# Patient Record
Sex: FEMALE | Race: WHITE | NOT HISPANIC OR LATINO | Employment: FULL TIME | URBAN - METROPOLITAN AREA
[De-identification: names, ages, dates, MRNs, and addresses within clinical notes are randomized per-mention and may not be internally consistent; named-entity substitution may affect disease eponyms.]

---

## 2017-11-27 ENCOUNTER — HISTORICAL (OUTPATIENT)
Dept: ENDOSCOPY | Facility: HOSPITAL | Age: 57
End: 2017-11-27

## 2021-03-03 ENCOUNTER — HISTORICAL (OUTPATIENT)
Dept: ADMINISTRATIVE | Facility: HOSPITAL | Age: 61
End: 2021-03-03

## 2021-03-03 LAB
ALBUMIN SERPL-MCNC: 4.2 GM/DL (ref 3.4–5)
ALBUMIN/GLOB SERPL: 1.91 {RATIO} (ref 1.5–2.5)
ALP SERPL-CCNC: 37 UNIT/L (ref 38–126)
ALT SERPL-CCNC: 17 UNIT/L (ref 7–52)
AST SERPL-CCNC: 20 UNIT/L (ref 15–37)
BILIRUB SERPL-MCNC: 0.4 MG/DL (ref 0.2–1)
BILIRUBIN DIRECT+TOT PNL SERPL-MCNC: 0.1 MG/DL (ref 0–0.5)
BILIRUBIN DIRECT+TOT PNL SERPL-MCNC: 0.3 MG/DL
BUN SERPL-MCNC: 18 MG/DL (ref 7–18)
CALCIUM SERPL-MCNC: 9 MG/DL (ref 8.5–10)
CHLORIDE SERPL-SCNC: 103 MMOL/L (ref 98–107)
CHOLEST SERPL-MCNC: 156 MG/DL (ref 0–200)
CHOLEST/HDLC SERPL: 3.4 {RATIO}
CO2 SERPL-SCNC: 28 MMOL/L (ref 21–32)
CREAT SERPL-MCNC: 0.89 MG/DL (ref 0.6–1.3)
GLOBULIN SER-MCNC: 2.2 GM/DL (ref 1.2–3)
GLUCOSE SERPL-MCNC: 106 MG/DL (ref 74–106)
HDLC SERPL-MCNC: 46 MG/DL (ref 35–60)
LDLC SERPL CALC-MCNC: 88 MG/DL (ref 0–129)
POTASSIUM SERPL-SCNC: 4.1 MMOL/L (ref 3.5–5.1)
PROT SERPL-MCNC: 6.4 GM/DL (ref 6.4–8.2)
SODIUM SERPL-SCNC: 138 MMOL/L (ref 136–145)
TRIGL SERPL-MCNC: 174 MG/DL (ref 30–150)
VLDLC SERPL CALC-MCNC: 34.8 MG/DL

## 2021-09-13 ENCOUNTER — HISTORICAL (OUTPATIENT)
Dept: ADMINISTRATIVE | Facility: HOSPITAL | Age: 61
End: 2021-09-13

## 2021-09-13 LAB
ALBUMIN SERPL-MCNC: 4.1 GM/DL (ref 3.4–5)
ALBUMIN/GLOB SERPL: 1.78 {RATIO} (ref 1.5–2.5)
ALP SERPL-CCNC: 44 UNIT/L (ref 38–126)
ALT SERPL-CCNC: 12 UNIT/L (ref 7–52)
AST SERPL-CCNC: 22 UNIT/L (ref 15–37)
BILIRUB SERPL-MCNC: 0.5 MG/DL (ref 0.2–1)
BILIRUBIN DIRECT+TOT PNL SERPL-MCNC: 0.1 MG/DL (ref 0–0.5)
BILIRUBIN DIRECT+TOT PNL SERPL-MCNC: 0.4 MG/DL
BUN SERPL-MCNC: 19 MG/DL (ref 7–18)
CALCIUM SERPL-MCNC: 9 MG/DL (ref 8.5–10)
CHLORIDE SERPL-SCNC: 103 MMOL/L (ref 98–107)
CHOLEST SERPL-MCNC: 159 MG/DL (ref 0–200)
CHOLEST/HDLC SERPL: 3 {RATIO}
CO2 SERPL-SCNC: 23 MMOL/L (ref 21–32)
CREAT SERPL-MCNC: 0.74 MG/DL (ref 0.6–1.3)
GLOBULIN SER-MCNC: 2.3 GM/DL (ref 1.2–3)
GLUCOSE SERPL-MCNC: 98 MG/DL (ref 74–106)
HDLC SERPL-MCNC: 53 MG/DL (ref 35–60)
LDLC SERPL CALC-MCNC: 89 MG/DL (ref 0–129)
POTASSIUM SERPL-SCNC: 4.2 MMOL/L (ref 3.5–5.1)
PROT SERPL-MCNC: 6.4 GM/DL (ref 6.4–8.2)
SODIUM SERPL-SCNC: 140 MMOL/L (ref 136–145)
TRIGL SERPL-MCNC: 105 MG/DL (ref 30–150)
VLDLC SERPL CALC-MCNC: 21 MG/DL

## 2022-04-10 ENCOUNTER — HISTORICAL (OUTPATIENT)
Dept: ADMINISTRATIVE | Facility: HOSPITAL | Age: 62
End: 2022-04-10

## 2022-04-26 VITALS
DIASTOLIC BLOOD PRESSURE: 69 MMHG | BODY MASS INDEX: 19.88 KG/M2 | SYSTOLIC BLOOD PRESSURE: 123 MMHG | HEIGHT: 62 IN | WEIGHT: 108 LBS

## 2022-04-30 NOTE — OP NOTE
DATE OF SURGERY:    11/27/2017    SURGEON:  NOLVIA Ponce MD    PROCEDURE:  48 hour pH monitoring.    PREOPERATIVE DIAGNOSIS:  Constant throat clearing.  Rule out correlation between acid reflux and throat clearing.    POSTOPERATIVE DIAGNOSIS:    1. No significant acid reflux.  2. No significant correlation between cough and acid reflux event.    PROCEDURE IN DETAIL:  The procedure was performed in technically satisfactory fashion.       Combined findings revealed 48 hours of study observation, 27 hours in upright position, 21 hours in supine position.  Ruddy-DeMeester average was 4.6, normals being less than 14.72.  Symptom analysis revealed one complaint of heartburn and 22 complaints of cough.  Symptom associated probability was 82%, making this not statistically significant.     The patient had been off PPIs for this test for a month prior to the procedure.        ______________________________  NOLVIA Ponce MD    JPH/RICA  DD:  12/14/2017  Time:  07:21AM  DT:  12/14/2017  Time:  01:21PM  Job #:  [id number]    cc: MD JAVED Gallegos Jr., MD

## 2022-04-30 NOTE — H&P
Patient:   Katherine Pino            MRN: [id number]            FIN: [id number]               Age:   57 years     Sex:  Female     :  1960   Associated Diagnoses:   None   Author:   ANNMARIE Ponce MD      Chief Complaint   Constant throat clearing and possible reflux correlation      History of Present Illness   Pleasant 57-year-old female with a greater than year history of intermittent episodes of constant throat clearing.  It has not been related to asthma or ENT sources.  She was placed on PPIs and H2 is thinking that this might be the etiology of her throat clearing.  She denies any odynophagia or any regurgitation.  Her weight is stable she has no hematemesis or melena.  Interestingly when I took her off the PPI and H2 blocker for the bravo test her throat clearing improved a bit but she did begin to have some heartburn that she had not had in the past she is brought in today for a 48 hour bravo pH study.  She has been off of PPIs and H2 use for the past 10 days.      Health Status   Current medications:  (Selected)   Documented Medications  Documented  Boniva 150 mg oral tablet: 150 mg = 1 tab(s), Oral, qMonth, # 1 tab(s), 0 Refill(s)  Zantac 150 oral tablet: 150 mg = 1 tab(s), Oral, BID, # 180 tab(s), 0 Refill(s)  metFORMIN: Oral, 0 Refill(s)  omeprazole 40 mg oral DR capsule: 80 mg = 2 cap(s), Oral, Daily, # 180 cap(s), 0 Refill(s),    No qualifying data available        Physical Examination      Vital Signs (last 24 hrs)_____  Last Charted___________  Temp Tympanic    36.4 DegC  (:)  Heart Rate Peripheral   72 bpm  (:)  Resp Rate         14 br/min  (:)  SBP      120 mmHg  (:)  DBP      73 mmHg  (:)  SpO2      100 %  (:)     General:  Alert and oriented.    Eye:  Pupils are equal, round and reactive to light.    HENT:  Normal hearing.    Neck:  Supple.    Respiratory:  Lungs are clear to auscultation.     Cardiovascular:  Normal rate, Regular rhythm.       Impression and Plan   Rule out reflux related throat clearing    EGD    ANNMARIE Ponce M.D.

## 2023-03-13 PROBLEM — M81.0 AGE RELATED OSTEOPOROSIS: Status: ACTIVE | Noted: 2023-03-13

## 2023-03-13 PROBLEM — E78.1 HYPERTRIGLYCERIDEMIA: Status: ACTIVE | Noted: 2023-03-13

## 2023-03-13 PROBLEM — I10 PRIMARY HYPERTENSION: Status: ACTIVE | Noted: 2023-03-13

## 2024-03-14 PROBLEM — R73.03 PREDIABETES: Status: ACTIVE | Noted: 2024-03-14

## 2024-03-14 PROBLEM — E04.2 MULTINODULAR THYROID: Status: ACTIVE | Noted: 2024-03-14

## 2024-11-19 ENCOUNTER — HOSPITAL ENCOUNTER (EMERGENCY)
Facility: HOSPITAL | Age: 64
Discharge: HOME | End: 2024-11-19
Attending: EMERGENCY MEDICINE
Payer: MEDICARE

## 2024-11-19 VITALS
DIASTOLIC BLOOD PRESSURE: 76 MMHG | BODY MASS INDEX: 20.8 KG/M2 | TEMPERATURE: 98 F | HEIGHT: 62 IN | SYSTOLIC BLOOD PRESSURE: 125 MMHG | HEART RATE: 72 BPM | OXYGEN SATURATION: 96 % | WEIGHT: 113 LBS | RESPIRATION RATE: 18 BRPM

## 2024-11-19 DIAGNOSIS — S76.911A MUSCLE STRAIN OF RIGHT THIGH, INITIAL ENCOUNTER: Primary | ICD-10-CM

## 2024-11-19 DIAGNOSIS — M79.604 RIGHT LEG PAIN: ICD-10-CM

## 2024-11-19 PROCEDURE — 99284 EMERGENCY DEPT VISIT MOD MDM: CPT | Mod: 25

## 2024-11-19 RX ORDER — TIZANIDINE 2 MG/1
2 TABLET ORAL EVERY 8 HOURS PRN
Qty: 30 TABLET | Refills: 0 | Status: SHIPPED | OUTPATIENT
Start: 2024-11-19 | End: 2024-11-29

## 2024-11-19 RX ORDER — IBUPROFEN 800 MG/1
800 TABLET ORAL EVERY 6 HOURS PRN
Qty: 20 TABLET | Refills: 0 | Status: SHIPPED | OUTPATIENT
Start: 2024-11-19

## 2024-11-19 NOTE — FIRST PROVIDER EVALUATION
Medical screening examination initiated.  I have conducted a focused provider triage encounter, findings are as follows:    Brief history of present illness:  64-year-old female presents to ED for evaluation of right leg/thigh pain over the last week.  Denies any trauma or injury.  Patient concerned about a blood clot    There were no vitals filed for this visit.    Pertinent physical exam:  Patient is awake and alert and oriented.  Ambulatory to triage.  In no acute distress.      Brief workup plan:  US    Preliminary workup initiated; this workup will be continued and followed by the physician or advanced practice provider that is assigned to the patient when roomed.

## 2024-11-20 NOTE — DISCHARGE INSTRUCTIONS
For pain, take ibuprofen every 6 hours as needed.  Okay to alternate with over-the-counter Tylenol.      Additionally, you may take tizanidine every 8 hours as needed.      Recommend rest, ice, heat.    Recommend follow up with primary care.  Return to ER with worsening symptoms.

## 2024-11-20 NOTE — ED PROVIDER NOTES
Encounter Date: 2024       History     Chief Complaint   Patient presents with    Leg Pain     Pt to ED via POV. Pt endorses R thigh pain X a few days. Denies swelling, redness, and traumatic injuries.     See MDM for details     The history is provided by the patient.     Review of patient's allergies indicates:  No Known Allergies  No past medical history on file.  Past Surgical History:   Procedure Laterality Date    Abdomen endoscopy (2017)      Biopsy Gastrointestinal (2017)      Levy PH (2017      Colonoscopy (2019)      COSMETIC SURGERY  ?    Breast augmentation    D/C in past      Esophagogastroduodenoscopy (2017)      Esophagogastroduodenoscopy, flexible, transoral; with biopsy, single or multiple (2017)      Excision of Middle Esophagus, Via Natural or Artificial Opening      Excision of Upper Esophagus, Via Natural or Artificial Opening      H/O breast augmentation      History of tonsillectomy      TONSILLECTOMY  ?     Family History   Problem Relation Name Age of Onset    Hypertension Mother Rose Collette         HBP    Heart attack Mother Rose Collette     Arthritis Mother Rose Collette         Arthritis    COPD Mother Rose Collette         Had COPD    Heart disease Mother Rose Collette          of a heart attack    Skin cancer Father Etley Collette     Leukemia Father Etley Collette     Cancer Father Etley Collette          of leukemia 15 years ago. Also had skin cancer.    Depression Father Etley Collette         Suffered with depression    Hypertension Sister      Hypertension Sister      Ulcerative colitis Sister      Rheum arthritis Sister      Arthritis Maternal Grandmother Angela Horan         Rheumatoid arthritis    Cancer Maternal Grandmother Angela Horan          of stomach cancer    Arthritis Sister Tiffanie Bello         Rheumatoid arthritis    Depression Sister Tiffanie Bello         Suffered with depression for a time in the past     Miscarriages / Stillbirths Sister Tiffanie Bello         Had a miscarriage    Arthritis Paternal Aunt Brisa Varma         Rheumatoid arthritis    Cancer Paternal Uncle Ames Collette          of cancer    Cancer Maternal Uncle Bhavik Horan          of cancer    Cancer Maternal Aunt Loni Damon          of cancer    Hypertension Sister Svitlana Akbar         HBP    Hypertension Sister Dakotah Carr         HBP    Miscarriages / Stillbirths Daughter Suri Groves         Had 3 miscarriages    Miscarriages / Stillbirths Daughter Jax Darden         Had 2 miscarriages     Social History     Tobacco Use    Smoking status: Never    Smokeless tobacco: Never   Substance Use Topics    Alcohol use: Yes     Alcohol/week: 2.0 standard drinks of alcohol     Types: 2 Cans of beer per week     Comment: Dont drink every week.    Drug use: Never     Review of Systems   Constitutional:  Negative for chills and fever.   Respiratory:  Negative for shortness of breath.    Cardiovascular:  Negative for chest pain.   Gastrointestinal:  Negative for abdominal pain.   Musculoskeletal:  Positive for myalgias. Negative for back pain and joint swelling.   Neurological:  Negative for weakness and numbness.   All other systems reviewed and are negative.      Physical Exam     Initial Vitals [24 1741]   BP Pulse Resp Temp SpO2   139/72 75 18 98.1 °F (36.7 °C) 99 %      MAP       --         Physical Exam    Nursing note and vitals reviewed.  Constitutional: She appears well-developed and well-nourished. No distress.   HENT:   Head: Normocephalic and atraumatic.   Eyes: Conjunctivae and EOM are normal.   Neck:   Normal range of motion.  Cardiovascular:  Normal rate.           Pulmonary/Chest: No respiratory distress.   Musculoskeletal:      Cervical back: Normal range of motion.      Comments: Lumbar spine: No midline tenderness.  Range of motion normal.  No paraspinal muscle tenderness.  Negative straight leg raise  bilaterally.  Right lower extremity: Tender to palpation to right lateral thigh and anterior thigh.  Range of motion normal with some discomfort.  5/5 motor strength.     Neurological: She is alert and oriented to person, place, and time. She has normal strength. No sensory deficit. GCS score is 15. GCS eye subscore is 4. GCS verbal subscore is 5. GCS motor subscore is 6.   Skin: Skin is warm and dry.   Psychiatric: She has a normal mood and affect. Thought content normal.         ED Course   Procedures  Labs Reviewed - No data to display       Imaging Results    None          Medications - No data to display  Medical Decision Making  64-year-old female presents to the ER for evaluation of right thigh pain times 5 days.  Patient denies any explicit injury or trauma.  She shares that she does exercise regularly.  She localizes her pain to the right lateral thigh with radiation to the right medial thigh.  She reports worsening pain with palpation and certain movements.  She denies any bruising, color changes, numbness, tingling.  She denies any other symptoms at this time.  She denies any lower back pain.  Per triage note, patient was concerned with DVT.    On physical exam patient does have some muscular tenderness.  Range of motion normal with mild discomfort.  5/5 motor strength.  Suspect muscular strain.  Ultrasound imaging was ordered which was negative for any DVT or acute abnormality.  Given the fact that the patient has not had any acute injury in her pain appears to be muscular in nature, I do not believe that additional imaging is warranted at this time.  Recommend treatment with anti-inflammatories and muscle relaxers as needed.  Recommend follow up with primary care for continued management and evaluation.  Patient verbalized her understanding.    Risk  Prescription drug management.      Additional MDM:   Differential Diagnosis:   Other: The following diagnoses were also considered and will be evaluated:  sciatica, bursitis and muscle spasm.            ED Course as of 11/19/24 2058 Tue Nov 19, 2024 1914 US Venous Doppler Right Lower Extremity: Negative for deep and superficial vein thrombosis in the right lower extremity  [LM]      ED Course User Index  [LM] Susie Han PA                           Clinical Impression:  Final diagnoses:  [M79.604] Right leg pain  [S76.911A] Muscle strain of right thigh, initial encounter (Primary)          ED Disposition Condition    Discharge Stable          ED Prescriptions       Medication Sig Dispense Start Date End Date Auth. Provider    ibuprofen (ADVIL,MOTRIN) 800 MG tablet Take 1 tablet (800 mg total) by mouth every 6 (six) hours as needed for Pain. 20 tablet 11/19/2024 -- Susie Han PA    tiZANidine (ZANAFLEX) 2 MG tablet Take 1 tablet (2 mg total) by mouth every 8 (eight) hours as needed (muscle pain). 30 tablet 11/19/2024 11/29/2024 Susie Han PA          Follow-up Information       Follow up With Specialties Details Why Contact Info    JAVED García Jr., MD Family Medicine Schedule an appointment as soon as possible for a visit  As needed, If symptoms worsen [address] [facility] [place] [address]  [phone]               Susie Han PA  11/19/24 2058